# Patient Record
Sex: MALE | Employment: OTHER | URBAN - METROPOLITAN AREA
[De-identification: names, ages, dates, MRNs, and addresses within clinical notes are randomized per-mention and may not be internally consistent; named-entity substitution may affect disease eponyms.]

---

## 2019-10-08 ENCOUNTER — TRANSFERRED RECORDS (OUTPATIENT)
Dept: HEALTH INFORMATION MANAGEMENT | Facility: CLINIC | Age: 74
End: 2019-10-08

## 2021-05-24 ENCOUNTER — TRANSFERRED RECORDS (OUTPATIENT)
Dept: HEALTH INFORMATION MANAGEMENT | Facility: CLINIC | Age: 76
End: 2021-05-24

## 2021-06-10 ENCOUNTER — TRANSFERRED RECORDS (OUTPATIENT)
Dept: HEALTH INFORMATION MANAGEMENT | Facility: CLINIC | Age: 76
End: 2021-06-10

## 2021-07-19 ENCOUNTER — TRANSFERRED RECORDS (OUTPATIENT)
Dept: HEALTH INFORMATION MANAGEMENT | Facility: CLINIC | Age: 76
End: 2021-07-19

## 2022-06-03 ENCOUNTER — TRANSFERRED RECORDS (OUTPATIENT)
Dept: HEALTH INFORMATION MANAGEMENT | Facility: CLINIC | Age: 77
End: 2022-06-03

## 2022-07-24 ENCOUNTER — TRANSFERRED RECORDS (OUTPATIENT)
Dept: NEUROSURGERY | Facility: CLINIC | Age: 77
End: 2022-07-24

## 2022-08-16 ENCOUNTER — TELEPHONE (OUTPATIENT)
Dept: NEUROSURGERY | Facility: CLINIC | Age: 77
End: 2022-08-16

## 2022-08-16 NOTE — TELEPHONE ENCOUNTER
Phone just rings unable to LM, [facility]. 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 Thank You Bibiana

## 2022-08-25 ENCOUNTER — OFFICE VISIT (OUTPATIENT)
Dept: ORTHOPEDICS | Facility: OTHER | Age: 77
End: 2022-08-25
Payer: COMMERCIAL

## 2022-08-25 ENCOUNTER — ANCILLARY PROCEDURE (OUTPATIENT)
Dept: GENERAL RADIOLOGY | Facility: OTHER | Age: 77
End: 2022-08-25
Attending: ORTHOPAEDIC SURGERY
Payer: COMMERCIAL

## 2022-08-25 VITALS
HEART RATE: 88 BPM | BODY MASS INDEX: 28.14 KG/M2 | SYSTOLIC BLOOD PRESSURE: 127 MMHG | WEIGHT: 190 LBS | HEIGHT: 69 IN | DIASTOLIC BLOOD PRESSURE: 63 MMHG | RESPIRATION RATE: 18 BRPM

## 2022-08-25 DIAGNOSIS — S46.012A TRAUMATIC COMPLETE TEAR OF LEFT ROTATOR CUFF, INITIAL ENCOUNTER: ICD-10-CM

## 2022-08-25 DIAGNOSIS — M25.512 LEFT SHOULDER PAIN, UNSPECIFIED CHRONICITY: Primary | ICD-10-CM

## 2022-08-25 DIAGNOSIS — M25.512 LEFT SHOULDER PAIN, UNSPECIFIED CHRONICITY: ICD-10-CM

## 2022-08-25 PROCEDURE — 99203 OFFICE O/P NEW LOW 30 MIN: CPT | Performed by: ORTHOPAEDIC SURGERY

## 2022-08-25 PROCEDURE — 73030 X-RAY EXAM OF SHOULDER: CPT | Mod: TC | Performed by: RADIOLOGY

## 2022-08-25 NOTE — PROGRESS NOTES
Kd Matamoros is a 77 year old male who [facility].  His problems with his shoulder started in 2005 with a waterskiing injury.  He ended up with a left rotator cuff tear which was repaired at that time.  He subsequently had a retear and had 2 surgeries in 2014 to try to fix this.  The surgeries never worked well and have created scarring and apparent avulsion of the deltoid muscle anteriorly.  He has a very tender scar anteriorly with a defect of the deltoid.  He has been followed at [facility] and at the [facility].  He has had steroid injections in the shoulder for a suspected chronic rotator cuff tear.    X-ray now shows 2 anchors in the humerus.  There is mild arthritic changes in the glenohumeral joint.    History reviewed. No pertinent past medical history.    History reviewed. No pertinent surgical history.    History reviewed. No pertinent family history.    Social History     Socioeconomic History     Marital status: Single     Spouse name: Not on file     Number of children: Not on file     Years of education: Not on file     Highest education level: Not on file   Occupational History     Not on file   Tobacco Use     Smoking status: Not on file     Smokeless tobacco: Not on file   Substance and Sexual Activity     Alcohol use: Not on file     Drug use: Not on file     Sexual activity: Not on file   Other Topics Concern     Not on file   Social History Narrative     Not on file     Social Determinants of Health     Financial Resource Strain: Not on file   Food Insecurity: Not on file   Transportation Needs: Not on file   Physical Activity: Not on file   Stress: Not on file   Social Connections: Not on file   Intimate Partner Violence: Not on file   Housing Stability: Not on file       No current outpatient medications on file.       Not on File    REVIEW OF SYSTEMS:  CONSTITUTIONAL:  NEGATIVE for fever, chills, change in weight, not feeling tired  SKIN:  NEGATIVE for  "worrisome rashes, no skin lumps, no skin ulcers and no non-healing wounds  EYES:  NEGATIVE for vision changes or irritation.  ENT/MOUTH:  NEGATIVE.  No hearing loss, no hoarseness, no difficulty swallowing.  RESP:  NEGATIVE. No cough or shortness of breath.  CV:  NEGATIVE for chest pain, palpitations or peripheral edema  GI:  NEGATIVE for nausea, abdominal pain, heartburn, or change in bowel habits  :  Negative. No dysuria, no hematuria  MUSCULOSKELETAL:  See HPI above  NEURO:  NEGATIVE . No headaches, no dizziness,  no numbness  ENDOCRINE:  NEGATIVE for temperature intolerance, skin/hair changes  HEME/ALLERGY/IMMUNE:  NEGATIVE for bleeding problems  PSYCHIATRIC:  NEGATIVE. no anxiety, no depression.     Exam:  Vitals: /63   Pulse 88   Resp 18   Ht 1.74 m (5' 8.5\")   Wt 86.2 kg (190 lb)   BMI 28.47 kg/m    BMI= Body mass index is 28.47 kg/m .  Constitutional:  healthy, alert and no distress  Neuro: Alert and Oriented x 3, no focal defects.  Psych: Affect normal   Respiratory: Breathing not labored.  Cardiovascular: normal peripheral pulses  Lymph: no adenopathy  Skin: No rashes,worrisome lesions or skin problems  He has fairly good rotation of the shoulder, but can abduct to only 45 degrees with pain.  He also has flexion to 140 degrees.  He has weakness with resisted external rotation and abduction of the left shoulder.  He also has mild weakness with internal rotation.  All of these produce some pain.  He is very tender over the anterior portion of the shoulder where there is an anterior scar with atrophy or dissociation of the deltoid beneath this.    Assessment:  Chronic left rotator cuff tear.  He also has a defect in the deltoid, and I am not sure how to deal with this problem with a reverse total shoulder arthroplasty.  Normally a functioning deltoid is a prerequisite.  For this reason I will refer to [facility] shoulder department for evaluation.  "

## 2022-08-25 NOTE — LETTER
8/25/2022         RE: Kd Matamoros  [address] [facility] [address]        Dear Colleague,    Thank you for referring your patient, Kd Matamoros, to the [facility] [place]. Please see a copy of my visit note below.    Kd Matamoros is a 77 year old male who [facility].  His problems with his shoulder started in 2005 with a waterskiing injury.  He ended up with a left rotator cuff tear which was repaired at that time.  He subsequently had a retear and had 2 surgeries in 2014 to try to fix this.  The surgeries never worked well and have created scarring and apparent avulsion of the deltoid muscle anteriorly.  He has a very tender scar anteriorly with a defect of the deltoid.  He has been followed at [facility] and at the [facility].  He has had steroid injections in the shoulder for a suspected chronic rotator cuff tear.    X-ray now shows 2 anchors in the humerus.  There is mild arthritic changes in the glenohumeral joint.    History reviewed. No pertinent past medical history.    History reviewed. No pertinent surgical history.    History reviewed. No pertinent family history.    Social History     Socioeconomic History     Marital status: Single     Spouse name: Not on file     Number of children: Not on file     Years of education: Not on file     Highest education level: Not on file   Occupational History     Not on file   Tobacco Use     Smoking status: Not on file     Smokeless tobacco: Not on file   Substance and Sexual Activity     Alcohol use: Not on file     Drug use: Not on file     Sexual activity: Not on file   Other Topics Concern     Not on file   Social History Narrative     Not on file     Social Determinants of Health     Financial Resource Strain: Not on file   Food Insecurity: Not on file   Transportation Needs: Not on file   Physical Activity: Not on file   Stress: Not on file   Social Connections: Not on file  "  Intimate Partner Violence: Not on file   Housing Stability: Not on file       No current outpatient medications on file.       Not on File    REVIEW OF SYSTEMS:  CONSTITUTIONAL:  NEGATIVE for fever, chills, change in weight, not feeling tired  SKIN:  NEGATIVE for worrisome rashes, no skin lumps, no skin ulcers and no non-healing wounds  EYES:  NEGATIVE for vision changes or irritation.  ENT/MOUTH:  NEGATIVE.  No hearing loss, no hoarseness, no difficulty swallowing.  RESP:  NEGATIVE. No cough or shortness of breath.  CV:  NEGATIVE for chest pain, palpitations or peripheral edema  GI:  NEGATIVE for nausea, abdominal pain, heartburn, or change in bowel habits  :  Negative. No dysuria, no hematuria  MUSCULOSKELETAL:  See HPI above  NEURO:  NEGATIVE . No headaches, no dizziness,  no numbness  ENDOCRINE:  NEGATIVE for temperature intolerance, skin/hair changes  HEME/ALLERGY/IMMUNE:  NEGATIVE for bleeding problems  PSYCHIATRIC:  NEGATIVE. no anxiety, no depression.     Exam:  Vitals: /63   Pulse 88   Resp 18   Ht 1.74 m (5' 8.5\")   Wt 86.2 kg (190 lb)   BMI 28.47 kg/m    BMI= Body mass index is 28.47 kg/m .  Constitutional:  healthy, alert and no distress  Neuro: Alert and Oriented x 3, no focal defects.  Psych: Affect normal   Respiratory: Breathing not labored.  Cardiovascular: normal peripheral pulses  Lymph: no adenopathy  Skin: No rashes,worrisome lesions or skin problems  He has fairly good rotation of the shoulder, but can abduct to only 45 degrees with pain.  He also has flexion to 140 degrees.  He has weakness with resisted external rotation and abduction of the left shoulder.  He also has mild weakness with internal rotation.  All of these produce some pain.  He is very tender over the anterior portion of the shoulder where there is an anterior scar with atrophy or dissociation of the deltoid beneath this.    Assessment:  Chronic left rotator cuff tear.  He also has a defect in the deltoid, and I " am not sure how to deal with this problem with a reverse total shoulder arthroplasty.  Normally a functioning deltoid is a prerequisite.  For this reason I will refer to [facility] shoulder department for evaluation.      Again, thank you for allowing me to participate in the care of your patient.        Sincerely,        Shreyas Gastelum MD

## 2022-08-30 ENCOUNTER — MEDICAL CORRESPONDENCE (OUTPATIENT)
Dept: HEALTH INFORMATION MANAGEMENT | Facility: CLINIC | Age: 77
End: 2022-08-30

## 2022-08-30 ENCOUNTER — TELEPHONE (OUTPATIENT)
Dept: ORTHOPEDICS | Facility: CLINIC | Age: 77
End: 2022-08-30

## 2022-08-30 NOTE — TELEPHONE ENCOUNTER
Patient's form was filled out as much as possible, placed in Dr. Gastelum's box for review and signature.     A copy of the original referral was sent to scanning.     Leni Pérez RN   [facility]

## 2022-08-30 NOTE — TELEPHONE ENCOUNTER
Action 2022 2:12 PM MT   Action Taken Called patient to update Care Everywhere, patient gave VBOK.  Sent a request for records and imaging from the [facility] [phone].     Action 2022 8:39 AM MT   Action Taken Records received by [facility] and sent to urgent scan.   Sent a request to [facility] for OP reports and implant records [phone].      Action 2022 9:49 AM MT   Action Taken Sent a request for EMG from [facility] [phone] and MRI Image report from [facility] imaging [phone].     Action 2022 1:22 PM MT   Action Taken Sent a STAT request for an EMG from [facility].     DIAGNOSIS: Left shoulder pain, unspecified chronicity   APPOINTMENT DATE: 2022   NOTES STATUS DETAILS   OFFICE NOTE from referring provider Internal 2022 - Shreyas Gastelum MD - [facility]     OFFICE NOTE from other specialist EPIC 2020 - Horacio Fishman - [facility]    2020 - Killian Sandra DO - [facility]    2021, 10/05/2018, 2018, 2015, more... - Edwin Lyn MD - [facility]    10/08/2019 - Emery Crockett - [facility]    10/29/2014, 10/16/2014 - Oscar Forrester DPT - [facility]    10/15/2014, 09/15/2014, 2014 - Charlotte Ruiz MD - [facility]   OPERATIVE REPORT In process    Please have patient sign GREG in clinic for [place] records. 2014 - Open LT Rotator Cuff Repair with Subacromial Decompression @ [facility] w/ Dr. Zarate/Brad    2014 - SUSHMA Hendrickson @ - [facility])    2005 - LT Rotator Cuff Reapir @ [facility]   MEDICATION LIST Care Everywhere  Internal  Media Tab    IMPLANT RECORD/STICKER In process    LABS     MRI PACS [facility]:  2022 - C Spine  [facility]/10/2021 - LT Upper Extremity   XRAYS (IMAGES & REPORTS) PACS Internal:  2022 - LT Shoulder  [facility]:  2021 - LT Shoulder  10/08/2019 - LT Shoulder

## 2022-08-30 NOTE — TELEPHONE ENCOUNTER
Reason for Call:  Form, our goal is to have forms completed with 72 hours, however, some forms may require a visit or additional information.    Type of letter, form or note:  [facility]    Who is the form from?: [facility] (if other please explain)    Where did the form come from: form was faxed in    What clinic location was the form placed at?: [facility]    Where the form was placed: [facility] Box/Folder    What number is listed as a contact on the form?: [phone]       Additional comments:     Call taken on 8/30/2022 at 8:57 AM by Anne Marie Callahan RN

## 2022-08-31 ENCOUNTER — TRANSFERRED RECORDS (OUTPATIENT)
Dept: HEALTH INFORMATION MANAGEMENT | Facility: CLINIC | Age: 77
End: 2022-08-31

## 2022-08-31 ENCOUNTER — TELEPHONE (OUTPATIENT)
Dept: NEUROSURGERY | Facility: CLINIC | Age: 77
End: 2022-08-31

## 2022-08-31 DIAGNOSIS — M25.512 LEFT SHOULDER PAIN, UNSPECIFIED CHRONICITY: Primary | ICD-10-CM

## 2022-08-31 NOTE — TELEPHONE ENCOUNTER
DONITA 8/31- [facility] ref [facility] - reason for request( pain in left arm ) - sending ref to scanning

## 2022-09-01 ENCOUNTER — OFFICE VISIT (OUTPATIENT)
Dept: ORTHOPEDICS | Facility: CLINIC | Age: 77
End: 2022-09-01
Attending: ORTHOPAEDIC SURGERY
Payer: COMMERCIAL

## 2022-09-01 ENCOUNTER — ANCILLARY PROCEDURE (OUTPATIENT)
Dept: GENERAL RADIOLOGY | Facility: CLINIC | Age: 77
End: 2022-09-01
Attending: ORTHOPAEDIC SURGERY
Payer: COMMERCIAL

## 2022-09-01 ENCOUNTER — PRE VISIT (OUTPATIENT)
Dept: ORTHOPEDICS | Facility: CLINIC | Age: 77
End: 2022-09-01

## 2022-09-01 DIAGNOSIS — M25.512 LEFT SHOULDER PAIN, UNSPECIFIED CHRONICITY: ICD-10-CM

## 2022-09-01 DIAGNOSIS — M19.212 SECONDARY OSTEOARTHRITIS OF LEFT SHOULDER DUE TO ROTATOR CUFF TEAR: Primary | ICD-10-CM

## 2022-09-01 DIAGNOSIS — M75.102 SECONDARY OSTEOARTHRITIS OF LEFT SHOULDER DUE TO ROTATOR CUFF TEAR: Primary | ICD-10-CM

## 2022-09-01 PROCEDURE — 73030 X-RAY EXAM OF SHOULDER: CPT | Mod: LT | Performed by: RADIOLOGY

## 2022-09-01 PROCEDURE — 99204 OFFICE O/P NEW MOD 45 MIN: CPT | Performed by: ORTHOPAEDIC SURGERY

## 2022-09-01 RX ORDER — DOXYCYCLINE HYCLATE 100 MG
100 TABLET ORAL
COMMUNITY
Start: 2022-05-18

## 2022-09-01 RX ORDER — OMEPRAZOLE 40 MG/1
40 CAPSULE, DELAYED RELEASE ORAL
COMMUNITY
Start: 2022-04-20

## 2022-09-01 RX ORDER — TAMSULOSIN HYDROCHLORIDE 0.4 MG/1
0.4 CAPSULE ORAL
COMMUNITY
Start: 2022-01-03

## 2022-09-01 RX ORDER — FOLIC ACID 1 MG/1
1 TABLET ORAL
COMMUNITY
Start: 2022-05-18

## 2022-09-01 RX ORDER — TROSPIUM CHLORIDE 20 MG/1
20 TABLET, FILM COATED ORAL
COMMUNITY

## 2022-09-01 RX ORDER — ISOSORBIDE MONONITRATE 60 MG/1
90 TABLET, EXTENDED RELEASE ORAL
COMMUNITY
Start: 2021-09-20

## 2022-09-01 RX ORDER — ATORVASTATIN CALCIUM 80 MG/1
80 TABLET, FILM COATED ORAL
COMMUNITY
Start: 2021-09-20

## 2022-09-01 RX ORDER — RANOLAZINE 500 MG/1
500 TABLET, EXTENDED RELEASE ORAL
COMMUNITY
Start: 2022-07-21

## 2022-09-01 RX ORDER — METOPROLOL SUCCINATE 50 MG/1
25 TABLET, EXTENDED RELEASE ORAL
COMMUNITY
Start: 2021-06-07

## 2022-09-01 RX ORDER — PREDNISONE 20 MG/1
20 TABLET ORAL
COMMUNITY
Start: 2022-05-18

## 2022-09-01 ASSESSMENT — COLUMBIA-SUICIDE SEVERITY RATING SCALE - C-SSRS
1. WITHIN THE PAST MONTH, HAVE YOU WISHED YOU WERE DEAD OR WISHED YOU COULD GO TO SLEEP AND NOT WAKE UP?: YES
3. IN THE PAST MONTH, HAVE YOU BEEN THINKING ABOUT HOW YOU MIGHT KILL YOURSELF?: NO
2. IN THE PAST MONTH, HAVE YOU ACTUALLY HAD ANY THOUGHTS OF KILLING YOURSELF?: YES
6. HAVE YOU EVER DONE ANYTHING, STARTED TO DO ANYTHING, OR PREPARED TO DO ANYTHING TO END YOUR LIFE?: NO
5. IN THE PAST MONTH, HAVE YOU STARTED TO WORK OUT OR WORKED OUT THE DETAILS OF HOW TO KILL YOURSELF? DO YOU INTEND TO CARRY OUT THIS PLAN?: NO
5. IN THE PAST MONTH, HAVE YOU STARTED TO WORK OUT OR WORKED OUT THE DETAILS OF HOW TO KILL YOURSELF? DO YOU INTEND TO CARRY OUT THIS PLAN?: NO

## 2022-09-01 ASSESSMENT — PATIENT HEALTH QUESTIONNAIRE - PHQ9: SUM OF ALL RESPONSES TO PHQ QUESTIONS 1-9: 27

## 2022-09-01 NOTE — NURSING NOTE
Depression Screening Follow-up    PHQ 9/1/2022   PHQ-9 Total Score 27   Q9: Thoughts of better off dead/self-harm past 2 weeks Nearly every day         C-SSRS ([place]rief [place]) 9/1/2022   Within the last month, have you wished you were dead or wished you could go to sleep and not wake up? Yes   Within the last month, have you had any actual thoughts of killing yourself? Yes   Within the last month, have you been thinking about how you might do this? No   Within the last month, have you had these thoughts and had some intention of acting on them? No   Within the last month, have you started to work out or worked out the details of how to kill yourself with the intent to carry out this plan? No   Within the last month, have you ever done anything, started to do anything, or prepared to do anything to end your life? No     Follow Up         Crisis resource information provided in the After Visit Summary     Pt has a well established support system with the [facility] and they call him weekly to touch base and make sure he is on track. He did not want any additional resources at this time.    Jose Marquez RN

## 2022-09-01 NOTE — PATIENT INSTRUCTIONS
Crisis Resources    During today s visit, you let us know that you may be dealing with more stress or depression. Your mental health is a key part of your overall health. We urge you to reach out to your primary care provider. Or, ask to speak to someone at the clinic before you leave today.     Once you are home, refer to the resources below as needed.    Steps to care for yourself    If you are currently in counseling, call your counselor for an appointment  Call the local crisis resources below if needed.  Contact friends or family for support.  Get more exercise.  Do activities you enjoy.  Eat a well-balanced diet and drink plenty of fluids.  Rest as needed.  Limit alcohol and recreational drugs. These can worsen depression.    When to contact your primary care provider     You have thoughts of harming or killing yourself but have not made a plan to carry it out.  Your depression gets in the way of daily activities.  You are often unable to sleep.  You need help cutting back on alcohol or recreational drugs.    When to call 911 or go to the Emergency Room     Get emergency help right away if you have any of the following:  You are planning to harm or kill yourself and you have a way to carry out the plan.   You have injured yourself or others. Or, you think you will.  You feel confused or are having trouble thinking or remembering.  You are having delusions (false beliefs).  You are hearing voices or seeing things that aren t there.  You are feeling psychotic (paranoid, fearful, restless, agitated, nervous, racing thoughts or speech)    Crisis Resources     These hotlines are for both adults and children. The and are open 24 hours a day, 7 days a week unless noted otherwise.    National Suicide Prevention Lifeline   Call 988 or 8-959-112-NPNJ (8076)    Crisis Text Line    www.crisistextline.org  Text HOME to 222592 from anywhere in the [place], anytime, about any type of crisis. A live, trained crisis  counselor will receive the text and respond quickly.    [employer] Lifeline for LGBTQ Youth  A national crisis intervention and suicide lifeline for LGBTQ youth under 25. Provides a safe place to talk without judgement.   Call 1-813.385.4352; text START to 937473 or visit www.theKitwarevorproject.org to talk to a trained counselor.  For [facility] crisis numbers, visit the [facility] website at:  https://[place].gov/dhs/people-we-serve/adults/health-care/mental-health/resources/crisis-contacts.jsp

## 2022-09-01 NOTE — PROGRESS NOTES
CHIEF COMPLAINT: left shoulder pain    DIAGNOSIS: Left shoulder history of 3 prior rotator cuff repairs done open, anterior deltoid defect, mild glenohumeral joint arthritis    OCCUPATION/SPORT: retired     HPI:   Kd Matamoros is a very pleasant 77 year old, Right-hand dominant male who presents for evaluation of Left shoulder pain.  Symptoms started in 2005. There no a precipitating event.  His Wife was diagnosed with cancer in 2006 and had other medical issues and he was not able to afford to have surgery again on his shoulder as he was taking care of her. The pain is located to the left shoulder. Worst pain is rated a 12 of 10, and current pain is rated at 3/4 of 10. Symptoms are worsened by movement and over head activies. Symptoms are improved with a steroid inejction. Patient has tried an injection with good relief. Last injection was Jan 2021 and he had about 2 months of relief.  Associated symptoms include constant soreness. Patient has shooting radiating down the arm with movement, and no numbness. Notably, the patient has had 3 rotator cuff repairs, 2005 at [facility] and 2 in 2014 in October [facility] Second on in April at [facility] with Dr. Zarate now Happy. No other concerns or complaints at this time.  Patient also has mild Parkinson's disease.    PAST MEDICAL HISTORY:  No past medical history on file.    PAST SURGICAL HISTORY:  No past surgical history on file.    CURRENT MEDICATIONS:  Current Outpatient Medications   Medication Sig Dispense Refill     aspirin (ASA) 81 MG EC tablet 81 mg       atorvastatin (LIPITOR) 80 MG tablet 80 mg       doxycycline hyclate (VIBRA-TABS) 100 MG tablet 100 mg       folic acid (FOLVITE) 1 MG tablet 1 mg       isosorbide mononitrate (IMDUR) 60 MG 24 hr tablet 90 mg       lifitegrast (XIIDRA) 5 % opthalmic solution INSTILL 1 DROP INTO EACH EYE TWICE A DAY FOR DRY EYES       metoprolol succinate ER (TOPROL XL) 50 MG 24 hr tablet 25 mg        omeprazole (PRILOSEC) 40 MG DR capsule 40 mg       predniSONE (DELTASONE) 20 MG tablet 20 mg       ranolazine (RANEXA) 500 MG 12 hr tablet 500 mg       tamsulosin (FLOMAX) 0.4 MG capsule 0.4 mg       trospium (SANCTURA) 20 MG tablet Take 20 mg by mouth 2 times daily (before meals)         ALLERGIES:    No Known Allergies      FAMILY HISTORY: No pertinent family history, reviewed in EMR.    SOCIAL HISTORY:   Social History     Socioeconomic History     Marital status: Single     Spouse name: Not on file     Number of children: Not on file     Years of education: Not on file     Highest education level: Not on file   Occupational History     Not on file   Tobacco Use     Smoking status: Current Every Day Smoker     Packs/day: 1.00     Smokeless tobacco: Never Used   Substance and Sexual Activity     Alcohol use: Not on file     Drug use: Not on file     Sexual activity: Not on file   Other Topics Concern     Not on file   Social History Narrative     Not on file     Social Determinants of Health     Financial Resource Strain: Not on file   Food Insecurity: Not on file   Transportation Needs: Not on file   Physical Activity: Not on file   Stress: Not on file   Social Connections: Not on file   Intimate Partner Violence: Not on file   Housing Stability: Not on file       REVIEW OF SYSTEMS: Positive for that noted in past medical history and history of present illness and otherwise reviewed in EMR    PHYSICAL EXAM:  Patient is Data Unavailable and weighs 0 lbs 0 oz There were no vitals taken for this visit.  There is no height or weight on file to calculate BMI.   Constitutional: Well-developed, well-nourished, healthy appearing male.  Skin: Warm, dry   HEENT: Normal  Cardiac: Well perfused extremities, strong 2+ peripheral pulses. No edema.   Pulmonary: Breathing room air    Musculoskeletal:   Left shoulder:  AROM left shoulder: 120 /80 /30/L1  AROM right shoulder: 170 /170 /50/T12  PROM left shoulder: 140 /120  /50/T12  4/5 supraspinatus, 4/5 infraspinatus, 5/5 subscapularis  Patient has a prior anterior scar from his open rotator cuff tear with a noticeable anterior deltoid defect  Mildly positive Hornblower's  Mildly positive ER lag  Resting tremor, neurovascularly intact    X-RAYS:    zanca, and axillary radiographs of the left shoulder were ordered and reviewed by me personally in addition to the prior left shoulder x-rays showing mild glenohumeral joint space narrowing, evidence of prior distal clavicle resection, mild glenohumeral joint space narrowing with small inferior humeral head osteophyte    ADVANCED IMAGING:     IMPRESSION: 77 year old-year-old right hand dominant male, with failed open rotator cuff repair x3, now with dysfunctional rotator cuff, anterior deltoid defect, glenohumeral joint arthritis.     PLAN:     I discussed with the patient the etiology of their condition. We discussed at length the options as noted above.  Unfortunately the patient had both an MRI and an EMG within the last year but these are done at the [facility] and not available for review at this time.  I asked him to try to help us coordinate to get the MRI and EMG as these would be crucial for developing a treatment plan.  Unfortunately the patient has a large anterior deltoid defect which may make it difficult to do a reverse shoulder arthroplasty which would rely on his deltoid for functioning.  My worry would be that without a functioning deltoid there would be limited surgical options for the patient.  He was getting reasonable response to cortisone injections with about 2 months of pain control but does not feel like he is getting these frequently enough to help with his pain.  I asked the patient to try to get the EMG and MRI after I am able to review these I will discussed the case with my shoulder partners Dr. Mitchell and Dr. Polanco to see what solutions we think would be able to offer the patient given a deltoid defect  with dysfunctional rotator cuff.  I will call the patient to talk about options once we get more complete review of his records and are able to discuss as a shoulder section.  The patient was agreeable to this plan and I will be in contact with him soon.    At the conclusion of the office visit, Kd verbally acknowledged that I answered all of his questions satisfactorily.    Nilam Nieves MD  Orthopedic Surgery Sports Medicine and Shoulder Surgery

## 2022-09-01 NOTE — NURSING NOTE
Depression Response    Patient completed the PHQ-9 assessment for depression and scored >9? Yes  Question 9 on the PHQ-9 was positive for suicidality? Yes  Does patient have current mental health provider? Yes    Is this a virtual visit? No    I personally notified the following: visit provider and clinic nurse

## 2022-09-01 NOTE — LETTER
9/1/2022         RE: Kd Matamoros  [address] [facility] [address]        Dear Colleague,    Thank you for referring your patient, Kd Matamoros, to the [facility] [place]. Please see a copy of my visit note below.    CHIEF COMPLAINT: left shoulder pain    DIAGNOSIS: Left shoulder history of 3 prior rotator cuff repairs done open, anterior deltoid defect, mild glenohumeral joint arthritis    OCCUPATION/SPORT: retired     HPI:   Kd Matamoros is a very pleasant 77 year old, Right-hand dominant male who presents for evaluation of Left shoulder pain.  Symptoms started in 2005. There no a precipitating event.  His Wife was diagnosed with cancer in 2006 and had other medical issues and he was not able to afford to have surgery again on his shoulder as he was taking care of her. The pain is located to the left shoulder. Worst pain is rated a 12 of 10, and current pain is rated at 3/4 of 10. Symptoms are worsened by movement and over head activies. Symptoms are improved with a steroid inejction. Patient has tried an injection with good relief. Last injection was Jan 2021 and he had about 2 months of relief.  Associated symptoms include constant soreness. Patient has shooting radiating down the arm with movement, and no numbness. Notably, the patient has had 3 rotator cuff repairs, 2005 at [facility] and 2 in 2014 in October [facility] on in April at [facility] with Dr. Zarate now Happy. No other concerns or complaints at this time.  Patient also has mild Parkinson's disease.    PAST MEDICAL HISTORY:  No past medical history on file.    PAST SURGICAL HISTORY:  No past surgical history on file.    CURRENT MEDICATIONS:  Current Outpatient Medications   Medication Sig Dispense Refill     aspirin (ASA) 81 MG EC tablet 81 mg       atorvastatin (LIPITOR) 80 MG tablet 80 mg       doxycycline hyclate (VIBRA-TABS) 100 MG tablet 100 mg       folic acid  (FOLVITE) 1 MG tablet 1 mg       isosorbide mononitrate (IMDUR) 60 MG 24 hr tablet 90 mg       lifitegrast (XIIDRA) 5 % opthalmic solution INSTILL 1 DROP INTO EACH EYE TWICE A DAY FOR DRY EYES       metoprolol succinate ER (TOPROL XL) 50 MG 24 hr tablet 25 mg       omeprazole (PRILOSEC) 40 MG DR capsule 40 mg       predniSONE (DELTASONE) 20 MG tablet 20 mg       ranolazine (RANEXA) 500 MG 12 hr tablet 500 mg       tamsulosin (FLOMAX) 0.4 MG capsule 0.4 mg       trospium (SANCTURA) 20 MG tablet Take 20 mg by mouth 2 times daily (before meals)         ALLERGIES:    No Known Allergies      FAMILY HISTORY: No pertinent family history, reviewed in EMR.    SOCIAL HISTORY:   Social History     Socioeconomic History     Marital status: Single     Spouse name: Not on file     Number of children: Not on file     Years of education: Not on file     Highest education level: Not on file   Occupational History     Not on file   Tobacco Use     Smoking status: Current Every Day Smoker     Packs/day: 1.00     Smokeless tobacco: Never Used   Substance and Sexual Activity     Alcohol use: Not on file     Drug use: Not on file     Sexual activity: Not on file   Other Topics Concern     Not on file   Social History Narrative     Not on file     Social Determinants of Health     Financial Resource Strain: Not on file   Food Insecurity: Not on file   Transportation Needs: Not on file   Physical Activity: Not on file   Stress: Not on file   Social Connections: Not on file   Intimate Partner Violence: Not on file   Housing Stability: Not on file       REVIEW OF SYSTEMS: Positive for that noted in past medical history and history of present illness and otherwise reviewed in EMR    PHYSICAL EXAM:  Patient is Data Unavailable and weighs 0 lbs 0 oz There were no vitals taken for this visit.  There is no height or weight on file to calculate BMI.   Constitutional: Well-developed, well-nourished, healthy appearing male.  Skin: Warm, dry   HEENT:  Normal  Cardiac: Well perfused extremities, strong 2+ peripheral pulses. No edema.   Pulmonary: Breathing room air    Musculoskeletal:   Left shoulder:  AROM left shoulder: 120 /80 /30/L1  AROM right shoulder: 170 /170 /50/T12  PROM left shoulder: 140 /120 /50/T12  4/5 supraspinatus, 4/5 infraspinatus, 5/5 subscapularis  Patient has a prior anterior scar from his open rotator cuff tear with a noticeable anterior deltoid defect  Mildly positive Hornblower's  Mildly positive ER lag  Resting tremor, neurovascularly intact    X-RAYS:    zanca, and axillary radiographs of the left shoulder were ordered and reviewed by me personally in addition to the prior left shoulder x-rays showing mild glenohumeral joint space narrowing, evidence of prior distal clavicle resection, mild glenohumeral joint space narrowing with small inferior humeral head osteophyte    ADVANCED IMAGING:     IMPRESSION: 77 year old-year-old right hand dominant male, with failed open rotator cuff repair x3, now with dysfunctional rotator cuff, anterior deltoid defect, glenohumeral joint arthritis.     PLAN:     I discussed with the patient the etiology of their condition. We discussed at length the options as noted above.  Unfortunately the patient had both an MRI and an EMG within the last year but these are done at the [facility] and not available for review at this time.  I asked him to try to help us coordinate to get the MRI and EMG as these would be crucial for developing a treatment plan.  Unfortunately the patient has a large anterior deltoid defect which may make it difficult to do a reverse shoulder arthroplasty which would rely on his deltoid for functioning.  My worry would be that without a functioning deltoid there would be limited surgical options for the patient.  He was getting reasonable response to cortisone injections with about 2 months of pain control but does not feel like he is getting these frequently enough to help with his  pain.  I asked the patient to try to get the EMG and MRI after I am able to review these I will discussed the case with my shoulder partners Dr. Mitchell and Dr. Polanco to see what solutions we think would be able to offer the patient given a deltoid defect with dysfunctional rotator cuff.  I will call the patient to talk about options once we get more complete review of his records and are able to discuss as a shoulder section.  The patient was agreeable to this plan and I will be in contact with him soon.    At the conclusion of the office visit, Kd verbally acknowledged that I answered all of his questions satisfactorily.    Nilam Nieves MD  Orthopedic Surgery Sports Medicine and Shoulder Surgery

## 2022-09-01 NOTE — NURSING NOTE
Reason For Visit:   Chief Complaint   Patient presents with     Consult     Left shoulder pain.  Rotator cuff Ref Dr. Gastelum       There were no vitals taken for this visit.    Pain Assessment  Patient Currently in Pain: Yes  Primary Pain Location: Shoulder  Pain Descriptors: Heaven Rowe, ATC

## 2022-09-08 ENCOUNTER — OFFICE VISIT (OUTPATIENT)
Dept: NEUROSURGERY | Facility: CLINIC | Age: 77
End: 2022-09-08
Payer: COMMERCIAL

## 2022-09-08 DIAGNOSIS — M50.20 CERVICAL DISC HERNIATION: Primary | ICD-10-CM

## 2022-09-08 DIAGNOSIS — R20.2 PARESTHESIA OF HAND, BILATERAL: ICD-10-CM

## 2022-09-08 PROCEDURE — 99203 OFFICE O/P NEW LOW 30 MIN: CPT | Performed by: NURSE PRACTITIONER

## 2022-09-08 NOTE — PATIENT INSTRUCTIONS
-Physical therapy ordered. They will contact you to schedule.  -Please contact our clinic with questions or concerns at [phone].

## 2022-09-08 NOTE — LETTER
9/8/2022         RE: Kd Matamoros  [address] [facility] [address]        Dear Colleague,    Thank you for referring your patient, Kd Matamoros, to the [facility]UROSURG[facility]INIC [place]. Please see a copy of my visit note below.    [facility] Neurosurgery  Neurosurgery Clinic Visit      CC: bilateral hand paresthesias    Primary care Provider: No Ref-Primary, Physician    Reason For Visit:   [facility] and abnormal cervical MRI.    HPI: Kd Matamoros is a 77 year old male with a history of chronic left shoulder issues s/p multiple rotator cuff repairs who presents for [facility]. He recently saw Orthopedics at the [facility] to discuss his shoulder issues and he has been told there is nothing they can do to help him with that. He presents today to discuss his cervical MRI. He denies any neck pain. He reports dizziness with looking upward. He also reports bilateral thumb, index, and long finger paresthesias. No overt weakness other than left shoulder ROM difficulties which is chronic. He has not had any recent treatments. He denies any radicular arm pain. He inquires about pain management for his shoulder.    No past medical history on file.    Past Medical History reviewed with patient during visit.    No past surgical history on file.  Past Surgical History reviewed with patient during visit.    Current Outpatient Medications   Medication     aspirin (ASA) 81 MG EC tablet     atorvastatin (LIPITOR) 80 MG tablet     doxycycline hyclate (VIBRA-TABS) 100 MG tablet     folic acid (FOLVITE) 1 MG tablet     isosorbide mononitrate (IMDUR) 60 MG 24 hr tablet     lifitegrast (XIIDRA) 5 % opthalmic solution     metoprolol succinate ER (TOPROL XL) 50 MG 24 hr tablet     omeprazole (PRILOSEC) 40 MG DR capsule     predniSONE (DELTASONE) 20 MG tablet     ranolazine (RANEXA) 500 MG 12 hr tablet      tamsulosin (FLOMAX) 0.4 MG capsule     trospium (SANCTURA) 20 MG tablet     No current facility-administered medications for this visit.       No Known Allergies    Social History     Socioeconomic History     Marital status: Single   Tobacco Use     Smoking status: Current Every Day Smoker     Packs/day: 1.00     Smokeless tobacco: Never Used       No family history on file.      ROS: 10 point ROS neg other than the symptoms noted above in the HPI.    Vital Signs:   There were no vitals taken for this visit.      Examination:  Constitutional:  Alert, well nourished, NAD.  Memory: recent and remote memory   HEENT: Normocephalic, atraumatic.   Pulm:  Without shortness of breath   CV:  No pitting edema of BLE.      Neurological:  Awake  Alert  Oriented x 3  Speech clear    Motor exam:   Shoulder Abduction:   Right:  5/5   Left:  5/5  Biceps:                        Right:  5/5   Left:  5/5  Triceps:                       Right:  5/5   Left:  5/5  Wrist Extensors:          Right:  5/5   Left:  5/5  Wrist Flexors:              Right:  5/5   Left:  5/5  Intrinsics:                     Right:  5/5   Left:  5/5    Sensation normal to bilateral upper and lower extremities  Muscle tone to bilateral upper and lower extremities   Gait: Able to stand from a seated position. Normal non-antalgic, non-myelopathic gait.  Able to heel/toe walk without loss of balance    Cervical examination reveals good range of motion. Limited ROM of left shoulder.  No tenderness to palpation of the cervical spine or paraspinous muscles bilaterally.    Imaging:   Cervical MRI 7/24/2022  IMPRESSION:  1. Moderate-sized right paracentral/foraminal disc extrusion at C6-7 contributes to moderate to severe right C6-7 foraminal stenosis, and potentially irritates the adjacent exiting right C7 nerve root.   2. Additional multilevel spinal degenerative changes, as described in detail above. Resulting foraminal stenosis is worst at C3-4, where it is  severe bilaterally. Although disc minimally indents the ventral spinal cord at C6-7, central canal stenosis is only minimal in degree at this level. Mild significant central canal stenosis elsewhere.  3. No intramedullary signal abnormality at any imaged level.  4. Degenerative grade 1 anterolisthesis at C4-5. Although there is grade 1 retrolisthesis at C3-4, partial interbody osseous fusion is present at this level. Remaining cervical vertebral alignment is preserved.    Assessment/Plan:   Cervical disc herniation  Bilateral hand paresthesias    Will begin PT at this time. If symptoms persist or worsen, would have him follow up with Dr. Evans to discuss other options. He verbalized understanding and agreement.    Patient Instructions   -Physical therapy ordered. They will contact you to schedule.  -Please contact our clinic with questions or concerns at [phone].      Rebecca Rajput CNP  [facility] Neurosurgery  [address] [place] [id number]  Tel [phone]  Fax [phone]        Again, thank you for allowing me to participate in the care of your patient.        Sincerely,        Rebecca Rajput, JOSE A

## 2022-09-08 NOTE — PROGRESS NOTES
[facility] Neurosurgery  Neurosurgery Clinic Visit      CC: bilateral hand paresthesias    Primary care Provider: No Ref-Primary, Physician    Reason For Visit:   [facility] for left shoulder pain and abnormal cervical MRI.    HPI: Kd Matamoros is a 77 year old male with a history of chronic left shoulder issues s/p multiple rotator cuff repairs who presents for evaluation of a [facility]. He recently saw Orthopedics at the [facility] to discuss his shoulder issues and he has been told there is nothing they can do to help him with that. He presents today to discuss his cervical MRI. He denies any neck pain. He reports dizziness with looking upward. He also reports bilateral thumb, index, and long finger paresthesias. No overt weakness other than left shoulder ROM difficulties which is chronic. He has not had any recent treatments. He denies any radicular arm pain. He inquires about pain management for his shoulder.    No past medical history on file.    Past Medical History reviewed with patient during visit.    No past surgical history on file.  Past Surgical History reviewed with patient during visit.    Current Outpatient Medications   Medication     aspirin (ASA) 81 MG EC tablet     atorvastatin (LIPITOR) 80 MG tablet     doxycycline hyclate (VIBRA-TABS) 100 MG tablet     folic acid (FOLVITE) 1 MG tablet     isosorbide mononitrate (IMDUR) 60 MG 24 hr tablet     lifitegrast (XIIDRA) 5 % opthalmic solution     metoprolol succinate ER (TOPROL XL) 50 MG 24 hr tablet     omeprazole (PRILOSEC) 40 MG DR capsule     predniSONE (DELTASONE) 20 MG tablet     ranolazine (RANEXA) 500 MG 12 hr tablet     tamsulosin (FLOMAX) 0.4 MG capsule     trospium (SANCTURA) 20 MG tablet     No current facility-administered medications for this visit.       No Known Allergies    Social History     Socioeconomic History     Marital status: Single   Tobacco Use     Smoking  status: Current Every Day Smoker     Packs/day: 1.00     Smokeless tobacco: Never Used       No family history on file.      ROS: 10 point ROS neg other than the symptoms noted above in the HPI.    Vital Signs:   There were no vitals taken for this visit.      Examination:  Constitutional:  Alert, well nourished, NAD.  Memory: recent and remote memory   HEENT: Normocephalic, atraumatic.   Pulm:  Without shortness of breath   CV:  No pitting edema of BLE.      Neurological:  Awake  Alert  Oriented x 3  Speech clear    Motor exam:   Shoulder Abduction:   Right:  5/5   Left:  5/5  Biceps:                        Right:  5/5   Left:  5/5  Triceps:                       Right:  5/5   Left:  5/5  Wrist Extensors:          Right:  5/5   Left:  5/5  Wrist Flexors:              Right:  5/5   Left:  5/5  Intrinsics:                     Right:  5/5   Left:  5/5    Sensation normal to bilateral upper and lower extremities  Muscle tone to bilateral upper and lower extremities   Gait: Able to stand from a seated position. Normal non-antalgic, non-myelopathic gait.  Able to heel/toe walk without loss of balance    Cervical examination reveals good range of motion. Limited ROM of left shoulder.  No tenderness to palpation of the cervical spine or paraspinous muscles bilaterally.    Imaging:   Cervical MRI 7/24/2022  IMPRESSION:  1. Moderate-sized right paracentral/foraminal disc extrusion at C6-7 contributes to moderate to severe right C6-7 foraminal stenosis, and potentially irritates the adjacent exiting right C7 nerve root.   2. Additional multilevel spinal degenerative changes, as described in detail above. Resulting foraminal stenosis is worst at C3-4, where it is severe bilaterally. Although disc minimally indents the ventral spinal cord at C6-7, central canal stenosis is only minimal in degree at this level. Mild significant central canal stenosis elsewhere.  3. No intramedullary signal abnormality at any imaged level.  4.  Degenerative grade 1 anterolisthesis at C4-5. Although there is grade 1 retrolisthesis at C3-4, partial interbody osseous fusion is present at this level. Remaining cervical vertebral alignment is preserved.    Assessment/Plan:   Cervical disc herniation  Bilateral hand paresthesias    Will begin PT at this time. If symptoms persist or worsen, would have him follow up with Dr. Evans to discuss other options. He verbalized understanding and agreement.    Patient Instructions   -Physical therapy ordered. They will contact you to schedule.  -Please contact our clinic with questions or concerns at [phone].      Rebecca Rajput, [facility] Neurosurgery  [address] [place] [id number]  Tel [phone]  Fax [phone]